# Patient Record
Sex: FEMALE | Race: BLACK OR AFRICAN AMERICAN
[De-identification: names, ages, dates, MRNs, and addresses within clinical notes are randomized per-mention and may not be internally consistent; named-entity substitution may affect disease eponyms.]

---

## 2017-11-12 ENCOUNTER — HOSPITAL ENCOUNTER (EMERGENCY)
Dept: HOSPITAL 17 - NEPD | Age: 22
Discharge: HOME | End: 2017-11-12
Payer: COMMERCIAL

## 2017-11-12 VITALS — DIASTOLIC BLOOD PRESSURE: 70 MMHG | SYSTOLIC BLOOD PRESSURE: 134 MMHG

## 2017-11-12 VITALS — HEIGHT: 65 IN | WEIGHT: 121.25 LBS | BODY MASS INDEX: 20.2 KG/M2

## 2017-11-12 VITALS
HEART RATE: 89 BPM | RESPIRATION RATE: 12 BRPM | OXYGEN SATURATION: 99 % | DIASTOLIC BLOOD PRESSURE: 65 MMHG | TEMPERATURE: 98.6 F | SYSTOLIC BLOOD PRESSURE: 151 MMHG

## 2017-11-12 DIAGNOSIS — Z3A.09: ICD-10-CM

## 2017-11-12 DIAGNOSIS — O21.9: Primary | ICD-10-CM

## 2017-11-12 LAB
ANION GAP SERPL CALC-SCNC: 6 MEQ/L (ref 5–15)
BASOPHILS # BLD AUTO: 0 TH/MM3 (ref 0–0.2)
BASOPHILS NFR BLD: 0.3 % (ref 0–2)
BUN SERPL-MCNC: 5 MG/DL (ref 7–18)
CHLORIDE SERPL-SCNC: 108 MEQ/L (ref 98–107)
COLOR UR: YELLOW
COMMENT (UR): (no result)
CULTURE IF INDICATED: (no result)
EOSINOPHIL # BLD: 0 TH/MM3 (ref 0–0.4)
EOSINOPHIL NFR BLD: 0.4 % (ref 0–4)
ERYTHROCYTE [DISTWIDTH] IN BLOOD BY AUTOMATED COUNT: 13.4 % (ref 11.6–17.2)
GFR SERPLBLD BASED ON 1.73 SQ M-ARVRAT: 147 ML/MIN (ref 89–?)
GLUCOSE UR STRIP-MCNC: (no result) MG/DL
HCO3 BLD-SCNC: 23.6 MEQ/L (ref 21–32)
HCT VFR BLD CALC: 36 % (ref 35–46)
HEMO FLAGS: (no result)
HGB UR QL STRIP: (no result)
KETONES UR STRIP-MCNC: 80 MG/DL
LYMPHOCYTES # BLD AUTO: 1.4 TH/MM3 (ref 1–4.8)
LYMPHOCYTES NFR BLD AUTO: 11.6 % (ref 9–44)
MCH RBC QN AUTO: 32 PG (ref 27–34)
MCHC RBC AUTO-ENTMCNC: 35.3 % (ref 32–36)
MCV RBC AUTO: 90.7 FL (ref 80–100)
MONOCYTES NFR BLD: 5.1 % (ref 0–8)
MUCOUS THREADS #/AREA URNS LPF: (no result) /LPF
NEUTROPHILS # BLD AUTO: 9.9 TH/MM3 (ref 1.8–7.7)
NEUTROPHILS NFR BLD AUTO: 82.6 % (ref 16–70)
NITRITE UR QL STRIP: (no result)
PLATELET # BLD: 152 TH/MM3 (ref 150–450)
POTASSIUM SERPL-SCNC: 3.7 MEQ/L (ref 3.5–5.1)
RBC # BLD AUTO: 3.97 MIL/MM3 (ref 4–5.3)
SODIUM SERPL-SCNC: 138 MEQ/L (ref 136–145)
SP GR UR STRIP: 1.02 (ref 1–1.03)
SQUAMOUS #/AREA URNS HPF: 4 /HPF (ref 0–5)
WBC # BLD AUTO: 12 TH/MM3 (ref 4–11)

## 2017-11-12 PROCEDURE — 81001 URINALYSIS AUTO W/SCOPE: CPT

## 2017-11-12 PROCEDURE — 84702 CHORIONIC GONADOTROPIN TEST: CPT

## 2017-11-12 PROCEDURE — 80048 BASIC METABOLIC PNL TOTAL CA: CPT

## 2017-11-12 PROCEDURE — 85025 COMPLETE CBC W/AUTO DIFF WBC: CPT

## 2017-11-12 PROCEDURE — 96374 THER/PROPH/DIAG INJ IV PUSH: CPT

## 2017-11-12 PROCEDURE — 99285 EMERGENCY DEPT VISIT HI MDM: CPT

## 2017-11-12 NOTE — PD
HPI


Chief Complaint:  Pregnancy Related Problem


Time Seen by Provider:  11:23


Travel History


International Travel<30 days:  No


Contact w/Intl Traveler<30days:  No


Traveled to known affect area:  No





History of Present Illness


HPI


23yo F who is [id number] pregnant by LMP of 9/7/17 here with c/o nausea and 

vomiting.  Denies any fever, chest pain, sob, abdominal pain, dysuria, hematuria

, vaginal discharge or bleeding.  Pt had an US this pregnancy already.





PFSH


Past Medical History


Medical History:  Denies Significant Hx


Diminished Hearing:  No


Immunizations Current:  No


Influenza Vaccination:  No


Pregnant?:  Pregnant


LMP:  9/7/17





Past Surgical History


Surgical History:  No Previous Surgery





Social History


Alcohol Use:  No (RARE, PT DENIES)


Tobacco Use:  No (RARE, PT DENIES)


Substance Use:  No





Allergies-Medications


(Allergen,Severity, Reaction):  


Coded Allergies:  


     No Known Allergies (Unverified , 1/15/15)


Reported Meds & Prescriptions





Reported Meds & Active Scripts


Active


Zofran Odt (Ondansetron Odt) 4 Mg Tab 4 Mg SL Q12HR PRN


Flexeril (Cyclobenzaprine HCl) 10 Mg Tab 10 Mg PO TID


Voltaren (Diclofenac Sodium) 50 Mg Tabec 50 Mg PO TID








Review of Systems


Except as stated in HPI:  all other systems reviewed are Neg





Physical Exam


Narrative


GENERAL: 23yo F not in distress.


SKIN: Focused skin assessment warm/dry.


HEAD: Atraumatic. Normocephalic. 


EYES: Pupils equal and round. No scleral icterus. No injection or drainage. 


CARDIOVASCULAR: Regular rate and rhythm.  No murmur appreciated.


RESPIRATORY: No accessory muscle use. Clear to auscultation. Breath sounds 

equal bilaterally. 


GASTROINTESTINAL: Abdomen soft, non-tender, nondistended. No rebound tenderness 

or guarding.


MUSCULOSKELETAL: No obvious deformities. No clubbing.  No cyanosis.  No edema. 


NEUROLOGICAL: Awake and alert. No obvious cranial nerve deficits.  Motor 

grossly within normal limits. Normal speech.


PSYCHIATRIC: Appropriate mood and affect; insight and judgment normal.





Data


Data


Last Documented VS





Vital Signs








  Date Time  Temp Pulse Resp B/P (MAP) Pulse Ox O2 Delivery O2 Flow Rate FiO2


 


11/12/17 10:15 98.6 89 12 151/65 (93) 99   








Orders





 Orders


Ondansetron Inj (Zofran Inj) (11/12/17 11:45)


Complete Blood Count With Diff (11/12/17 11:37)


Basic Metabolic Panel (Bmp) (11/12/17 11:37)


Beta Hcg (Quant/Titer) (11/12/17 11:37)


Urinalysis - C+S If Indicated (11/12/17 11:37)


Ed Poc Ultrasound (11/12/17 )


Ed Poc Ultrasound (11/12/17 )


Ed Discharge Order (11/12/17 14:17)





Labs





Laboratory Tests








Test


  11/12/17


11:55 11/12/17


13:15


 


White Blood Count 12.0 TH/MM3  


 


Red Blood Count 3.97 MIL/MM3  


 


Hemoglobin 12.7 GM/DL  


 


Hematocrit 36.0 %  


 


Mean Corpuscular Volume 90.7 FL  


 


Mean Corpuscular Hemoglobin 32.0 PG  


 


Mean Corpuscular Hemoglobin


Concent 35.3 % 


  


 


 


Red Cell Distribution Width 13.4 %  


 


Platelet Count 152 TH/MM3  


 


Mean Platelet Volume 10.4 FL  


 


Neutrophils (%) (Auto) 82.6 %  


 


Lymphocytes (%) (Auto) 11.6 %  


 


Monocytes (%) (Auto) 5.1 %  


 


Eosinophils (%) (Auto) 0.4 %  


 


Basophils (%) (Auto) 0.3 %  


 


Neutrophils # (Auto) 9.9 TH/MM3  


 


Lymphocytes # (Auto) 1.4 TH/MM3  


 


Monocytes # (Auto) 0.6 TH/MM3  


 


Eosinophils # (Auto) 0.0 TH/MM3  


 


Basophils # (Auto) 0.0 TH/MM3  


 


CBC Comment DIFF FINAL  


 


Differential Comment   


 


Blood Urea Nitrogen 5 MG/DL  


 


Creatinine 0.61 MG/DL  


 


Random Glucose 82 MG/DL  


 


Calcium Level 9.1 MG/DL  


 


Sodium Level 138 MEQ/L  


 


Potassium Level 3.7 MEQ/L  


 


Chloride Level 108 MEQ/L  


 


Carbon Dioxide Level 23.6 MEQ/L  


 


Anion Gap 6 MEQ/L  


 


Estimat Glomerular Filtration


Rate 147 ML/MIN 


  


 


 


Human Chorionic Gonadotropin,


Quant [id number] MIU/ML 


  


 


 


Urine Color  YELLOW 


 


Urine Turbidity  HAZY 


 


Urine pH  5.5 


 


Urine Specific Gravity  1.023 


 


Urine Protein  TRACE mg/dL 


 


Urine Glucose (UA)  NEG mg/dL 


 


Urine Ketones  80 mg/dL 


 


Urine Occult Blood  NEG 


 


Urine Nitrite  NEG 


 


Urine Bilirubin  NEG 


 


Urine Urobilinogen


  


  LESS THAN 2.0


MG/DL


 


Urine Leukocyte Esterase  NEG 


 


Urine RBC  1 /hpf 


 


Urine WBC  1 /hpf 


 


Urine Squamous Epithelial


Cells 


  4 /hpf 


 


 


Urine Mucus  FEW /lpf 


 


Microscopic Urinalysis Comment


  


  CULT NOT


INDICATED











MDM


Medical Decision Making


Medical Screen Exam Complete:  Yes


Emergency Medical Condition:  Yes


Differential Diagnosis


Hyperemesis gravidarum vs. UTI


Narrative Course


23yo F with early pregnancy here with nausea and vomiting.  Pt denies any 

abdominal pain, vaginal bleeding or discharge.  Labs reviewed, WBC 12.  BMP 

unremarkable.  bHCG [id number].  UA showed 80 ketones.  WBC 1.  Culture not 

indicated.  Bedside US showed IUP with FHR and fetal movement.  Pt given zofran 

and nausea improved.  Pt is tolerating PO now.  Return precautions given.





Procedures


**Procedure Narrative**


Emergency Department Pelvic ultrasound was performed with patient consent.


The curvilinear probe was used in the transverse and sagittal views within the 

suprapubic region revealing single intrauterine pregnancy.  Fetal heart rate 

was 169bpm.





Diagnosis





 Primary Impression:  


 Nausea/vomiting in pregnancy


Patient Instructions:  General Instructions


Departure Forms:  Tests/Procedures





***Additional Instructions:  


Please follow up with your OBGYN in 2-3 days.  Return to the ED if symptoms 

worsen.


***Med/Other Pt SpecificInfo:  Prescription(s) given


Scripts


Ondansetron Odt (Zofran Odt) 4 Mg Tab


4 MG SL Q12HR Y for Nausea/Vomiting, #6 TAB 0 Refills


   Prov: Fiorella Cavanaugh DO         11/12/17


Disposition:  01 DISCHARGE HOME


Condition:  Stable











Fiorella Cavanaugh DO Nov 12, 2017 13:28

## 2018-05-29 ENCOUNTER — HOSPITAL ENCOUNTER (INPATIENT)
Dept: HOSPITAL 17 - HOBED | Age: 23
LOS: 3 days | Discharge: HOME | End: 2018-06-01
Attending: OBSTETRICS & GYNECOLOGY | Admitting: OBSTETRICS & GYNECOLOGY
Payer: COMMERCIAL

## 2018-05-29 VITALS — BODY MASS INDEX: 21.67 KG/M2 | WEIGHT: 130.07 LBS | HEIGHT: 65 IN

## 2018-05-29 DIAGNOSIS — R30.0: ICD-10-CM

## 2018-05-29 DIAGNOSIS — Z3A.37: ICD-10-CM

## 2018-05-29 PROCEDURE — 86900 BLOOD TYPING SEROLOGIC ABO: CPT

## 2018-05-29 PROCEDURE — 80307 DRUG TEST PRSMV CHEM ANLYZR: CPT

## 2018-05-29 PROCEDURE — 90715 TDAP VACCINE 7 YRS/> IM: CPT

## 2018-05-29 PROCEDURE — 81001 URINALYSIS AUTO W/SCOPE: CPT

## 2018-05-29 PROCEDURE — 85025 COMPLETE CBC W/AUTO DIFF WBC: CPT

## 2018-05-29 PROCEDURE — 86901 BLOOD TYPING SEROLOGIC RH(D): CPT

## 2018-05-29 PROCEDURE — 99284 EMERGENCY DEPT VISIT MOD MDM: CPT

## 2018-05-30 VITALS
TEMPERATURE: 98.3 F | SYSTOLIC BLOOD PRESSURE: 144 MMHG | DIASTOLIC BLOOD PRESSURE: 85 MMHG | RESPIRATION RATE: 17 BRPM | HEART RATE: 54 BPM

## 2018-05-30 LAB
BACTERIA #/AREA URNS HPF: (no result) /HPF
BASOPHILS # BLD AUTO: 0.1 TH/MM3 (ref 0–0.2)
BASOPHILS NFR BLD: 0.9 % (ref 0–2)
COLOR UR: YELLOW
EOSINOPHIL # BLD: 0.1 TH/MM3 (ref 0–0.4)
EOSINOPHIL NFR BLD: 0.5 % (ref 0–4)
ERYTHROCYTE [DISTWIDTH] IN BLOOD BY AUTOMATED COUNT: 13.9 % (ref 11.6–17.2)
GLUCOSE UR STRIP-MCNC: (no result) MG/DL
HCT VFR BLD CALC: 39.9 % (ref 35–46)
HGB BLD-MCNC: 13.5 GM/DL (ref 11.6–15.3)
HGB UR QL STRIP: (no result)
KETONES UR STRIP-MCNC: 10 MG/DL
LYMPHOCYTES # BLD AUTO: 3 TH/MM3 (ref 1–4.8)
LYMPHOCYTES NFR BLD AUTO: 23.7 % (ref 9–44)
MCH RBC QN AUTO: 29.8 PG (ref 27–34)
MCHC RBC AUTO-ENTMCNC: 33.9 % (ref 32–36)
MCV RBC AUTO: 87.9 FL (ref 80–100)
MONOCYTE #: 0.9 TH/MM3 (ref 0–0.9)
MONOCYTES NFR BLD: 7.3 % (ref 0–8)
MUCOUS THREADS #/AREA URNS LPF: (no result) /LPF
NEUTROPHILS # BLD AUTO: 8.6 TH/MM3 (ref 1.8–7.7)
NEUTROPHILS NFR BLD AUTO: 67.6 % (ref 16–70)
NITRITE UR QL STRIP: (no result)
PLATELET # BLD: 176 TH/MM3 (ref 150–450)
PMV BLD AUTO: 11.5 FL (ref 7–11)
RBC # BLD AUTO: 4.54 MIL/MM3 (ref 4–5.3)
SP GR UR STRIP: 1.01 (ref 1–1.03)
SQUAMOUS #/AREA URNS HPF: <1 /HPF (ref 0–5)
URINE LEUKOCYTE ESTERASE: (no result)
WBC # BLD AUTO: 12.8 TH/MM3 (ref 4–11)

## 2018-05-30 PROCEDURE — 0KQM0ZZ REPAIR PERINEUM MUSCLE, OPEN APPROACH: ICD-10-PCS | Performed by: OBSTETRICS & GYNECOLOGY

## 2018-05-30 RX ADMIN — Medication SCH ML: at 09:00

## 2018-05-30 RX ADMIN — OXYTOCIN SCH MLS/HR: 10 INJECTION, SOLUTION INTRAMUSCULAR; INTRAVENOUS at 16:17

## 2018-05-30 RX ADMIN — OXYTOCIN SCH MLS/HR: 10 INJECTION, SOLUTION INTRAMUSCULAR; INTRAVENOUS at 00:17

## 2018-05-30 NOTE — PD
HPI


Chief Complaint


ctxs, lof


Date Seen:  May 30, 2018


Time Seen:  00:55


Travel History


International Travel<30 Days:  No


Contact w/Intl Traveler<30Days:  No


Known Affected Area:  No





History of Present Illness


HPI


pt. is a 22 y/o  @ 37 6/7 weeks present w/ c/o ctxs and lof.  on present 

pt. noted to be gross srom and cervix 5/100/0.  pt. states had srom at 2200.  +

FM.


Weeks Gestation:  37


Para:  0


:  1





History


Past Medical History


Medical History:  Denies Significant Hx





Obstetric History


Obstetric History








Past Surgical History


Surgical History:  No Previous Surgery





Family History


Family History:  Negative





Social History


Alcohol Use:  No


Tobacco Use:  No


Substance Abuse:  No





Allergies-Medications


(Allergen,Severity, Reaction):  


Coded Allergies:  


     No Known Allergies (Unverified , 1/15/15)


Home Meds


Active Scripts


Ondansetron Odt (Zofran Odt) 4 Mg Tab, 4 MG SL Q12HR Y for Nausea/Vomiting, #6 

TAB 0 Refills


   Prov:Fiorella Cavanaugh DO         17


Cyclobenzaprine Hcl (Flexeril) 10 Mg Tab, 10 MG PO TID, #21 TAB


   Prov:Lincoln Chan MD         1/15/15


Diclofenac Sod (Voltaren) 50 Mg Tabec, 50 MG PO TID, #21 TAB


   Prov:Lincoln Chan MD         1/15/15





Review of Systems


Except as stated in HPI:  all other systems reviewed are Neg





Physical Exam


Narrative


GENERAL: Well-nourished, well-developed patient.


SKIN: Warm and dry.


HEAD: Normocephalic and atraumatic.


EYES: No scleral icterus. No injection or drainage. 


ENT: No nasal drainage noted. Mucous membranes pink. Airway patent.


NECK: Supple, trachea midline. No JVD.


CARDIOVASCULAR: Regular rate and rhythm without murmurs, gallops, or rubs. 


RESPIRATORY: Breath sounds equal bilaterally. No accessory muscle use.


ABDOMEN/GI: Abdomen soft, non-tender, bowel sounds present, no rebound, no 

guarding 


   Gravid


GENITOURINARY: 


   External Genitalia: intact and normal in appearance


   Cervix: ant


   Dilatation:  5


   Effacement: 100


   Station: 0


   Presentation: cephalic   


   Membranes: ruptured


   Uterine Contractions: q2min


FHT's: 


   Category: 1  


   Reactive: +


   Variability: mod 


EXTREMITIES: No cyanosis or edema.


BACK: Nontender without obvious deformity. No CVA tenderness.


NEUROLOGICAL: Awake and alert. Motor and sensory grossly within normal limits. 

Five out of 5 muscle strength in all muscle groups. Normal speech.





Data


Data


Vital Signs Reviewed:  Yes


Orders





 Orders


Ob (2e) Additional Admit Info (18 00:11)


Admit To Inpatient (18 )


Code Status (18:17)


Vital Signs (Adult) .Per protocol (18:17)


Activity Oob Ad Cha (18:17)


Fetal Heart (18:17)


Amnioinfusion (18:17)


Urinary Catheter Management .ONCE (18:17)


Diet Liquid (18 Breakfast)


Lactated Ringer's 1000 Ml Inj (Lr 1000 M (18 00:17)


Lactated Ringer's 1000 Ml Inj (Lr 1000 M (18 00:17)


Sodium Chlorid 0.9% 500 Ml Inj (Ns 500 M (18 00:30)


Sodium Chlor 0.9% 1000 Ml Inj (Ns 1000 M (18 00:37)


Lidocaine 1% Inj (50 Ml) (Xylocaine 1% I (18 00:30)


Citric Acid-Sodium Citrate Liq (Bicitra (18 00:30)


Fentanyl Inj (Fentanyl Inj) (18 00:30)


Fentanyl Inj (Fentanyl Inj) (18 00:30)


Complete Blood Count With Diff (18:17)


Hold Clot (18 00:17)


Abo/Rh Blood Type (18:17)


Urinalysis - C+S If Indicated (18:17)


Drug Screen, Random Urine (18 00:17)


Ob/Psych Drug Screen, Urine (18:17)


Resp Oxygen Non Rebreathe Mask (18 )


^ Epidural / Intrathecal Infus (18:17)


Oxytocin 30 Units-500ml Premix (Pitocin (18 00:30)


Lidocaine 1% Inj (50 Ml) (Xylocaine 1% I (18 00:30)


Light Mineral Oil (Muri-Lube Oil) (18 00:30)


Inpatient Certification (18 )


Lidocaine Pf 1% Inj (Xylocaine-Mpf 1% In (18 00:28)


Labs





Laboratory Tests








Test


  18


00:20


 


White Blood Count 12.8 


 


Red Blood Count 4.54 


 


Hemoglobin 13.5 


 


Hematocrit 39.9 


 


Mean Corpuscular Volume 87.9 


 


Mean Corpuscular Hemoglobin 29.8 


 


Mean Corpuscular Hemoglobin


Concent 33.9 


 


 


Red Cell Distribution Width 13.9 


 


Platelet Count 176 


 


Mean Platelet Volume 11.5 


 


Neutrophils (%) (Auto) 67.6 


 


Lymphocytes (%) (Auto) 23.7 


 


Monocytes (%) (Auto) 7.3 


 


Eosinophils (%) (Auto) 0.5 


 


Basophils (%) (Auto) 0.9 


 


Neutrophils # (Auto) 8.6 


 


Lymphocytes # (Auto) 3.0 


 


Monocytes # (Auto) 0.9 


 


Eosinophils # (Auto) 0.1 


 


Basophils # (Auto) 0.1 


 


CBC Comment DIFF FINAL 


 


Differential Comment  











MDM


Medical Record Reviewed:  Yes


Plan


pt. in active labor.  fht reassuring.  will admit to l&d.  stadol vs epidural 

for analgesia.  will continue to follow.


Diagnosis


Diagnosis:  


 Primary Impression:  


 SROM (spontaneous rupture of membranes)


 Additional Impression:  


 37 weeks gestation of pregnancy











Ace Lawson Jr., MD May 30, 2018 01:03

## 2018-05-30 NOTE — HHI.HP
History & Physical


H&P


HPI


Chief Complaint


ctxs, lof


Date Seen:  May 30, 2018


Time Seen:  00:55


Travel History


International Travel<30 Days:  No


Contact w/Intl Traveler<30Days:  No


Known Affected Area:  No





History of Present Illness


HPI


pt. is a 22 y/o  @ 37 6/7 weeks present w/ c/o ctxs and lof.  on present 

pt. noted to be gross srom and cervix 5/100/0.  pt. states had srom at 2200.  +

FM.


Weeks Gestation:  37


Para:  0


:  1





 History (Limited) 


History


Past Medical History


Medical History:  Denies Significant Hx





Obstetric History


Obstetric History








Past Surgical History


Surgical History:  No Previous Surgery





Family History


Family History:  Negative





Social History


Alcohol Use:  No


Tobacco Use:  No


Substance Abuse:  No





 Allergies-Medications 


Allergies-Medications


(Allergen,Severity, Reaction):  


Coded Allergies:  


     No Known Allergies (Unverified , 1/15/15)


Home Meds


Active Scripts


Ondansetron Odt (Zofran Odt) 4 Mg Tab, 4 MG SL Q12HR Y for Nausea/Vomiting, #6 

TAB 0 Refills


   Prov:Fiorella Cavanaugh DO         17


Cyclobenzaprine Hcl (Flexeril) 10 Mg Tab, 10 MG PO TID, #21 TAB


   Prov:Lincoln Chan MD         1/15/15


Diclofenac Sod (Voltaren) 50 Mg Tabec, 50 MG PO TID, #21 TAB


   Prov:Lincoln Chan MD         1/15/15





 ROS 


Review of Systems


Except as stated in HPI:  all other systems reviewed are Neg





 Physical Exam 


Physical Exam


Narrative


GENERAL: Well-nourished, well-developed patient.


SKIN: Warm and dry.


HEAD: Normocephalic and atraumatic.


EYES: No scleral icterus. No injection or drainage. 


ENT: No nasal drainage noted. Mucous membranes pink. Airway patent.


NECK: Supple, trachea midline. No JVD.


CARDIOVASCULAR: Regular rate and rhythm without murmurs, gallops, or rubs. 


RESPIRATORY: Breath sounds equal bilaterally. No accessory muscle use.


ABDOMEN/GI: Abdomen soft, non-tender, bowel sounds present, no rebound, no 

guarding 


   Gravid


GENITOURINARY: 


   External Genitalia: intact and normal in appearance


   Cervix: ant


   Dilatation:  5


   Effacement: 100


   Station: 0


   Presentation: cephalic   


   Membranes: ruptured


   Uterine Contractions: q2min


FHT's: 


   Category: 1  


   Reactive: +


   Variability: mod 


EXTREMITIES: No cyanosis or edema.


BACK: Nontender without obvious deformity. No CVA tenderness.


NEUROLOGICAL: Awake and alert. Motor and sensory grossly within normal limits. 

Five out of 5 muscle strength in all muscle groups. Normal speech.





 Data 


Vital Signs Reviewed:  Yes


Orders





 Orders


Ob (2e) Additional Admit Info (18 00:11)


Admit To Inpatient (18 )


Code Status (18:17)


Vital Signs (Adult) .Per protocol (18:17)


Activity Oob Ad Cha (18:17)


Fetal Heart (18:17)


Amnioinfusion (18:17)


Urinary Catheter Management .ONCE (18:17)


Diet Liquid (18 Breakfast)


Lactated Ringer's 1000 Ml Inj (Lr 1000 M (18 00:17)


Lactated Ringer's 1000 Ml Inj (Lr 1000 M (18 00:17)


Sodium Chlorid 0.9% 500 Ml Inj (Ns 500 M (18 00:30)


Sodium Chlor 0.9% 1000 Ml Inj (Ns 1000 M (18 00:37)


Lidocaine 1% Inj (50 Ml) (Xylocaine 1% I (18 00:30)


Citric Acid-Sodium Citrate Liq (Bicitra (18 00:30)


Fentanyl Inj (Fentanyl Inj) (18 00:30)


Fentanyl Inj (Fentanyl Inj) (18 00:30)


Complete Blood Count With Diff (18:17)


Hold Clot (18:17)


Abo/Rh Blood Type (18:17)


Urinalysis - C+S If Indicated (18 00:17)


Drug Screen, Random Urine (18 00:17)


Ob/Psych Drug Screen, Urine (18:17)


Resp Oxygen Non Rebreathe Mask (18 )


^ Epidural / Intrathecal Infus (18 00:17)


Oxytocin 30 Units-500ml Premix (Pitocin (18 00:30)


Lidocaine 1% Inj (50 Ml) (Xylocaine 1% I (18 00:30)


Light Mineral Oil (Muri-Lube Oil) (18 00:30)


Inpatient Certification (18 )


Lidocaine Pf 1% Inj (Xylocaine-Mpf 1% In (18 00:28)


Labs





Laboratory Tests








Test


  18


00:20


 


White Blood Count 12.8 


 


Red Blood Count 4.54 


 


Hemoglobin 13.5 


 


Hematocrit 39.9 


 


Mean Corpuscular Volume 87.9 


 


Mean Corpuscular Hemoglobin 29.8 


 


Mean Corpuscular Hemoglobin


Concent 33.9 


 


 


Red Cell Distribution Width 13.9 


 


Platelet Count 176 


 


Mean Platelet Volume 11.5 


 


Neutrophils (%) (Auto) 67.6 


 


Lymphocytes (%) (Auto) 23.7 


 


Monocytes (%) (Auto) 7.3 


 


Eosinophils (%) (Auto) 0.5 


 


Basophils (%) (Auto) 0.9 


 


Neutrophils # (Auto) 8.6 


 


Lymphocytes # (Auto) 3.0 


 


Monocytes # (Auto) 0.9 


 


Eosinophils # (Auto) 0.1 


 


Basophils # (Auto) 0.1 


 


CBC Comment DIFF FINAL 


 


Differential Comment  











 MDM 


MDM


Medical Record Reviewed:  Yes


Plan


pt. in active labor.  fht reassuring.  will admit to l&d.  stadol vs epidural 

for analgesia.  will continue to follow.


Diagnosis


Diagnosis:  


 Primary Impression:  


 SROM (spontaneous rupture of membranes)


 Additional Impression:  


 37 weeks gestation of pregnancy











Ace Lawson Jr., MD May 30, 2018 01:04

## 2018-05-31 VITALS
RESPIRATION RATE: 18 BRPM | SYSTOLIC BLOOD PRESSURE: 138 MMHG | DIASTOLIC BLOOD PRESSURE: 78 MMHG | TEMPERATURE: 98.4 F | HEART RATE: 62 BPM

## 2018-05-31 RX ADMIN — Medication SCH ML: at 09:00

## 2018-05-31 RX ADMIN — IBUPROFEN PRN MG: 800 TABLET, FILM COATED ORAL at 11:09

## 2018-05-31 RX ADMIN — IBUPROFEN PRN MG: 800 TABLET, FILM COATED ORAL at 21:32

## 2018-05-31 NOTE — HHI.OB
Subjective


Post Partum Day:  1


Remarks


Patient seen and examined this morning. AFVSS overnight. Postpartum day #1. no 

pain. Decreased lochia. dysuria improved compared to yesterday. No breast 

tenderness. She is breastfeeding. Appetite good. No nausea or vomiting. has 

passed flatus. no bowel movement. Ambulating well. Denies calf pain, shortness 

of breath, cough or any mood changes. She otherwise has no other complaints or 

concerns this morning.





Objective


Vitals/I&O





Vital Signs








  Date Time  Temp Pulse Resp B/P (MAP) Pulse Ox O2 Delivery O2 Flow Rate FiO2


 


18 20:06 98.3 54 17 144/85 (104)    








Objective Remarks


GENERAL: Well-nourished, well-developed patient.


CARDIOVASCULAR: Regular rate and rhythm without murmurs, gallops, or rubs. 


RESPIRATORY: Breath sounds equal bilaterally. No accessory muscle use.


ABDOMEN/GI: Abdomen soft, non-tender. 


   Fundus: Firm, non-tender below the umbilicus.


GENITOURINARY: Light to moderate bleeding.


EXTREMITIES: No cyanosis or edema, non-tender, without signs of DVT.


Medications and IVs





Current Medications








 Medications


  (Trade)  Dose


 Ordered  Sig/Radha


 Route  Start Time


 Stop Time Status Last Admin


 


 Lactated Ringer's  1,000 ml @ 


 125 mls/hr  Q8H


 IV  18 00:17


    18 00:17


 


 


 Lactated Ringer's  1,000 ml @ 


 3,000 mls/hr  Q20M PRN


 IV  18 00:17


     


 


 


 Sodium Chloride  500 ml @ 


 1,000 mls/hr  ONCE  PRN


 IV  18 00:30


     


 


 


 Sodium Chloride  1,000 ml @ 


 100 mls/hr  Q10H PRN


 IV  18 00:37


     


 


 


  (Xylocaine 1%


 Inj (50 ml))  0.1 ml  UNSCH X1  PRN


 I-DERMAL  18 00:30


 18 00:29   


 


 


  (Bicitra Liq)  30 ml  ON  CALL


 PO  18 00:30


 6/3/18 00:29   


 


 


  (fentaNYL INJ)  50 mcg  Q1H  PRN


 IV PUSH  18 00:30


     


 


 


  (fentaNYL INJ)  100 mcg  Q1H  PRN


 IV PUSH  18 00:30


    18 01:04


 


 


  (Xylocaine 1%


 Inj (50 ml))  10 ml  UNSCH X1  PRN


 INFIL  18 00:30


 18 00:29   


 


 


  (Muri-Lube Oil)  10 ml  UNSCH  PRN


 TOPICAL  18 00:30


     


 


 


  (NS Flush)  2 ml  BID


 IV FLUSH  18 09:00


     


 


 


  (NS Flush)  2 ml  UNSCH  PRN


 IV FLUSH  18 02:15


     


 


 


  (Tylenol)  650 mg  Q4H  PRN


 PO  18 02:15


     


 


 


  (Motrin)  800 mg  Q8H  PRN


 PO  18 02:15


     


 


 


  (Percocet  5-325


 Mg)  1 tab  Q4H  PRN


 PO  18 02:15


     


 


 


  (Percocet  5-325


 Mg)  2 tab  Q4H  PRN


 PO  18 02:15


     


 


 


  (Americaine 20%


 Top Spr)  1 spray  Q4H  PRN


 TOPICAL  18 02:15


     


 


 


  (Tucks Pads)  1 applic  QID  PRN


 TOPICAL  18 02:15


     


 


 


  (April-Colace)  2 tab  Q12H  PRN


 PO  18 02:15


     


 


 


  (Ambien)  5 mg  HS  PRN


 PO  18 02:15


     


 


 


  (Mag-Al Plus


 Susp Liq)  15 ml  Q8H  PRN


 PO  18 02:15


     


 


 


  (Zofran  Odt)  4 mg  Q6H  PRN


 PO  18 02:15


     


 











Assessment/Plan


Problem List:  


(1) Postpartum care following vaginal delivery


ICD Codes:  Z39.2 - Encounter for routine postpartum follow-up


Plan:  23 year old  PPD#1.


- AFVSS


- Encouraged OOB, as tolerated


- Motrin prn for pain


- Advised pelvic rest x 6 weeks


- breastfeeding


- Contraception: declined at this time


- Will f/u with OB provider in 6 weeks





Note written by Gt Carpio MS4


Reviewed by Niyah Gregory, PGY1














Niyah Gregory MD, R1 May 31, 2018 08:35

## 2018-06-01 RX ADMIN — IBUPROFEN PRN MG: 800 TABLET, FILM COATED ORAL at 09:42

## 2018-06-01 NOTE — HHI.OB
Subjective


Post Partum Day:  2


Remarks


Patient seen and examined this morning. AFVSS overnight. Postpartum day #2. 

Patient states her pain is well controlled. Decreased lochia. Denies dysuria. 

No breast tenderness. Appetite good. No nausea or vomiting. Ambulating well. 

Denies fevers or chills, calf pain, shortness of breath, or cough. She 

otherwise has no other complaints or concerns this morning.





Objective


Vitals/I&O





Vital Signs








  Date Time  Temp Pulse Resp B/P (MAP) Pulse Ox O2 Delivery O2 Flow Rate FiO2


 


18 20:40 98.4 62 18 138/78 (98)    








Objective Remarks


GENERAL: Well-nourished, well-developed patient.


CARDIOVASCULAR: Regular rate and rhythm without murmurs, gallops, or rubs. 


RESPIRATORY: Breath sounds equal bilaterally. No accessory muscle use.


ABDOMEN/GI: Abdomen soft, non-tender. 


   Fundus: Firm, non-tender below the umbilicus.


GENITOURINARY: Light to moderate bleeding.


EXTREMITIES: No cyanosis or edema, non-tender, without signs of DVT.


Medications and IVs





Current Medications








 Medications


  (Trade)  Dose


 Ordered  Sig/Radha


 Route  Start Time


 Stop Time Status Last Admin


 


 Lactated Ringer's  1,000 ml @ 


 125 mls/hr  Q8H


 IV  18 00:17


    18 00:17


 


 


 Lactated Ringer's  1,000 ml @ 


 3,000 mls/hr  Q20M PRN


 IV  18 00:17


     


 


 


 Sodium Chloride  500 ml @ 


 1,000 mls/hr  ONCE  PRN


 IV  18 00:30


     


 


 


 Sodium Chloride  1,000 ml @ 


 100 mls/hr  Q10H PRN


 IV  18 00:37


     


 


 


  (Xylocaine 1%


 Inj (50 ml))  0.1 ml  UNSCH X1  PRN


 I-DERMAL  18 00:30


 18 00:29   


 


 


  (Bicitra Liq)  30 ml  ON  CALL


 PO  18 00:30


 6/3/18 00:29   


 


 


  (fentaNYL INJ)  50 mcg  Q1H  PRN


 IV PUSH  18 00:30


     


 


 


  (fentaNYL INJ)  100 mcg  Q1H  PRN


 IV PUSH  18 00:30


    18 01:04


 


 


  (Muri-Lube Oil)  10 ml  UNSCH  PRN


 TOPICAL  18 00:30


     


 


 


  (NS Flush)  2 ml  BID


 IV FLUSH  18 09:00


     


 


 


  (NS Flush)  2 ml  UNSCH  PRN


 IV FLUSH  18 02:15


     


 


 


  (Tylenol)  650 mg  Q4H  PRN


 PO  18 02:15


     


 


 


  (Motrin)  800 mg  Q8H  PRN


 PO  18 02:15


    18 21:32


 


 


  (Percocet  5-325


 Mg)  1 tab  Q4H  PRN


 PO  18 02:15


     


 


 


  (Percocet  5-325


 Mg)  2 tab  Q4H  PRN


 PO  18 02:15


     


 


 


  (Americaine 20%


 Top Spr)  1 spray  Q4H  PRN


 TOPICAL  18 02:15


     


 


 


  (Tucks Pads)  1 applic  QID  PRN


 TOPICAL  18 02:15


     


 


 


  (April-Colace)  2 tab  Q12H  PRN


 PO  18 02:15


     


 


 


  (Ambien)  5 mg  HS  PRN


 PO  18 02:15


     


 


 


  (Mag-Al Plus


 Susp Liq)  15 ml  Q8H  PRN


 PO  18 02:15


     


 


 


  (Zofran  Odt)  4 mg  Q6H  PRN


 PO  18 02:15


     


 











Assessment/Plan


Problem List:  


(1) Postpartum care following vaginal delivery


ICD Codes:  Z39.2 - Encounter for routine postpartum follow-up


Plan:  23 year old  PPD#2.


- AFVSS


- Encouraged OOB, as tolerated


- Motrin prn for pain


- Advised pelvic rest x 6 weeks


- Breastfeeding


- Contraception: Discussed with patient this AM, patient declined at this time


- Instructed follow up with OB provider within 6 weeks





wdw OB hospitalist





Discharge Planning


Stable for discharge home today











Catracho Chavis MD R2 2018 08:35

## 2018-06-01 NOTE — [facility]DCP[facility]
Discharge Care Plan


Diagnosis:  


(1) Postpartum care following vaginal delivery


Report Symptoms to Your Doctor


-Temperature above 100.5 degrees


-Redness, of incision or excessive or foul smelling drainage


-Unusual pain or calf pain


-Increased vaginal bleeding


-Painful or difficulty urinating


-Feelings of extreme sadness or anxiety after 2 weeks


Goals to Promote Your Health


* To maintain your health at the optimal level, follow-up with your OB provider 

within 6 weeks after hospital discharge.


Directions to Meet Your Goals


*** Take your medications as prescribed


*** Follow your dietary instruction


*** Follow activity as directed


*** Ensure plenty of rest for recovery


*** Drink fluids for hydration








*** Keep your appointments as scheduled


*** Take your immunizations and boosters as scheduled


*** If your symptoms worsen call your PCP, if no PCP go to Urgent Care Center 

or Emergency Room***


*** Smoking is Dangerous to Your Health. Avoid second hand smoke***


***Call the 24-hour crisis hotline for domestic abuse at 1-152.329.3780***











Catracho Chavis MD R2 Jun 1, 2018 08:36